# Patient Record
Sex: MALE | Race: WHITE
[De-identification: names, ages, dates, MRNs, and addresses within clinical notes are randomized per-mention and may not be internally consistent; named-entity substitution may affect disease eponyms.]

---

## 2017-04-19 ENCOUNTER — HOSPITAL ENCOUNTER (OUTPATIENT)
Dept: HOSPITAL 47 - CPPFTMAIN | Age: 67
Discharge: HOME | End: 2017-04-19
Payer: MEDICARE

## 2017-04-19 DIAGNOSIS — J45.909: Primary | ICD-10-CM

## 2017-04-19 PROCEDURE — 94729 DIFFUSING CAPACITY: CPT

## 2017-04-19 PROCEDURE — 94726 PLETHYSMOGRAPHY LUNG VOLUMES: CPT

## 2017-04-19 PROCEDURE — 94060 EVALUATION OF WHEEZING: CPT

## 2017-06-15 ENCOUNTER — HOSPITAL ENCOUNTER (OUTPATIENT)
Dept: HOSPITAL 47 - ORWHC2ENDO | Age: 67
Discharge: HOME | End: 2017-06-15
Payer: MEDICARE

## 2017-06-15 VITALS — TEMPERATURE: 97.3 F

## 2017-06-15 VITALS — DIASTOLIC BLOOD PRESSURE: 80 MMHG | SYSTOLIC BLOOD PRESSURE: 121 MMHG | HEART RATE: 84 BPM

## 2017-06-15 VITALS — BODY MASS INDEX: 27.2 KG/M2

## 2017-06-15 VITALS — RESPIRATION RATE: 18 BRPM

## 2017-06-15 DIAGNOSIS — Z12.11: Primary | ICD-10-CM

## 2017-06-15 DIAGNOSIS — Z79.899: ICD-10-CM

## 2017-06-15 DIAGNOSIS — Z91.09: ICD-10-CM

## 2017-06-15 DIAGNOSIS — Z91.018: ICD-10-CM

## 2017-06-15 DIAGNOSIS — J44.9: ICD-10-CM

## 2017-06-15 DIAGNOSIS — K57.30: ICD-10-CM

## 2017-06-15 DIAGNOSIS — Z86.010: ICD-10-CM

## 2017-06-15 DIAGNOSIS — E78.5: ICD-10-CM

## 2017-06-15 NOTE — P.PCN
Date of Procedure: 06/15/17


Preoperative Diagnosis: 





Postoperative Diagnosis: 





Procedure(s) Performed: 


Procedure: Total colonoscopy.





Preoperative diagnosis: Screening for neoplasia, patient has history of polyps.





Postoperative diagnosis: Sigmoid diverticulosis with no evidence of acute 

diverticulitis, strictures, polyps or cancer.





Preparation: HalfLytely prep.





Sedation: Was provided by anesthesia.





Brief clinical history: The patient is a 66-year-old male who is scheduled for 

this evaluation for screening for neoplasia because of history of polyps.  He 

has no abdominal complaints, bleeding or anemia.  His last colonoscopy was in 

January 2012.  





Procedure: With the patient on his left lateral decubitus position and after 

informed consent and adequate sedation, the perianal area was inspected and it 

did not show any fissures or fistulas.  There were no masses felt on digital 

rectal examination.  The Olympus CFQ 160L was then inserted in the rectum in 

the usual fashion and advanced to the cecum.  The preparation was less than 

than ideal on the right side.  The mucosa appeared healthy.  No polyps or 

tumors were seen.  Several diverticular orifices were seen scattered in the 

sigmoid with no evidence of acute diverticulitis or strictures. I retroflexed 

the endoscope in the rectum before the endoscope was withdrawn.





The patient tolerated the procedure well.








Plan: The patient was reassured.  Discussed dietary measures.  With his less 

than ideal preparation today, I am recommending repeat exam in 3 years.  He 

will follow up with you as planned.


Implants: 





Indications for Procedure: 





Operative Findings: 





Description of Procedure:

## 2018-01-30 ENCOUNTER — HOSPITAL ENCOUNTER (OUTPATIENT)
Dept: HOSPITAL 47 - RADMRIMAIN | Age: 68
Discharge: HOME | End: 2018-01-30
Payer: MEDICARE

## 2018-01-30 DIAGNOSIS — M75.101: Primary | ICD-10-CM

## 2018-01-30 DIAGNOSIS — M25.711: ICD-10-CM

## 2018-01-30 NOTE — MR
EXAMINATION TYPE: MR shoulder RT wo con

 

DATE OF EXAM: 1/30/2018

 

COMPARISON: Plain film 1/12/2018

 

HISTORY:

 

TECHNIQUE: Multiplanar, multisequence imaging of the right shoulder is performed without contrast.

 

FINDINGS:

 

Rotator Cuff: There is torn supraspinatus tendon with retraction to the level of the acromioclavicula
r joint. Increased signal present within the infraspinatus tendon compatible with tendinosis.

 

Acromioclavicular Joint: Hypertrophic change present, acromioclavicular joint arthropathy noted with 
mass effect in the region of musculotendinous junction of supraspinatus, there is a distal acromial s
pur.

 

Glenohumeral Joint: Intact there is some hypertrophic change in the humeral head compatible with oste
oarthritic change

 

Labrum: Increased signal present in the superior labrum compatible with possible SLAP lesion

 

Biceps Tendon: The long head of biceps is in normal location within bicipital groove. Fluid signal pr
esent along the long head of biceps tendon.

 

Bone marrow signal: No focal abnormal marrow signal is appreciated.

 

Other: There is a joint effusion present. Fluid signal present in the subacromial subdeltoid bursa. F
luid signal extends along the region of the subscapularis tendon.

 

IMPRESSION: 

Rotator cuff tear. Distal acromial spur. Joint effusion. Possible SLAP lesion.

## 2018-03-18 NOTE — HP
HISTORY AND PHYSICAL



CHIEF COMPLAINT:

Right shoulder pain.



HISTORY OF PRESENT ILLNESS:

The patient is a 67-year-old, right-hand dominant, retired male who presents with

progressive right shoulder pain for the past several months.  He is having pain with

overhead use and at night despite conservative measures with 2 courses of therapy in

addition to medications.



PAST MEDICAL HISTORY:

Significant for asthma.



PAST SURGICAL HISTORY:

Negative.



CURRENT MEDICATIONS:

Advair, atorvastatin and niacin.



ALLERGIES:

Denies drug allergies.



FAMILY HISTORY:

Significant for heart disease.



SOCIAL HISTORY:

Negative for current tobacco or alcohol use.



REVIEW OF SYSTEMS:

Sixteen point review of systems otherwise reviewed and is noncontributory.



PHYSICAL EXAMINATION:

On examination, the patient is approximately 5 foot 10, 190 pounds, of mesomorphic

habitus.  HEENT exam is nonfocal.  Neck is supple.  On examination of his right

shoulder, he is tender about the anterior subacromial space.  He has moderate

subacromial crepitus.  Active range of motion forward elevation 150 degrees, external

rotation of the arm side 60 degrees, internal rotation to L3.  Motor strength is 5-/5

for abduction and external rotation.  Impingement test, Neer, and Speed tests are

positive.  His distal neurovascular exam otherwise appears to be intact in the right

upper extremity.



MRI report from 01/30/2018 of the right shoulder shows a supraspinatus tear with some

retraction along with a superior labral tear.



IMPRESSION:

1. Right shoulder symptomatic rotator cuff tear.

2. Right shoulder symptomatic SLAP lesion.



RECOMMENDATIONS:

I talked to the patient at length regarding his options.  At this point he is having

significant symptoms despite conservative measures.  After thorough discussion, he opts

to proceed with surgery.  We will plan to proceed with arthroscopic evaluation with

probable subacromial decompression, probable rotator cuff repair and possible biceps

tendon release.  Risks and benefits were discussed at length in layman's terms.  We

will likely perform that as an outpatient procedure.





MMODL / IJN: [id number] / Job#: [id number]

## 2018-03-27 ENCOUNTER — HOSPITAL ENCOUNTER (OUTPATIENT)
Dept: HOSPITAL 47 - OR | Age: 68
Discharge: HOME | End: 2018-03-27
Payer: MEDICARE

## 2018-03-27 VITALS — RESPIRATION RATE: 18 BRPM

## 2018-03-27 VITALS — TEMPERATURE: 97.4 F

## 2018-03-27 VITALS — HEART RATE: 104 BPM | DIASTOLIC BLOOD PRESSURE: 82 MMHG | SYSTOLIC BLOOD PRESSURE: 163 MMHG

## 2018-03-27 VITALS — BODY MASS INDEX: 27.8 KG/M2

## 2018-03-27 DIAGNOSIS — S43.431A: ICD-10-CM

## 2018-03-27 DIAGNOSIS — M75.101: Primary | ICD-10-CM

## 2018-03-27 DIAGNOSIS — J45.909: ICD-10-CM

## 2018-03-27 DIAGNOSIS — K21.9: ICD-10-CM
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
## 2018-03-27 DIAGNOSIS — Z79.899: ICD-10-CM

## 2018-03-27 PROCEDURE — 29827 SHO ARTHRS SRG RT8TR CUF RPR: CPT

## 2018-03-27 PROCEDURE — 29826 SHO ARTHRS SRG DECOMPRESSION: CPT

## 2018-03-27 RX ADMIN — MORPHINE SULFATE PRN MG: 2 INJECTION, SOLUTION INTRAMUSCULAR; INTRAVENOUS at 10:59

## 2018-03-27 RX ADMIN — MORPHINE SULFATE PRN MG: 2 INJECTION, SOLUTION INTRAMUSCULAR; INTRAVENOUS at 11:06

## 2018-03-27 RX ADMIN — MORPHINE SULFATE PRN MG: 2 INJECTION, SOLUTION INTRAMUSCULAR; INTRAVENOUS at 11:14

## 2018-03-27 RX ADMIN — MORPHINE SULFATE PRN MG: 2 INJECTION, SOLUTION INTRAMUSCULAR; INTRAVENOUS at 10:53

## 2018-03-27 RX ADMIN — MORPHINE SULFATE PRN MG: 2 INJECTION, SOLUTION INTRAMUSCULAR; INTRAVENOUS at 11:23

## 2018-03-27 NOTE — P.OP
Date of Procedure: 03/27/18


Preoperative Diagnosis: 


Symptomatic right rotator cuff tear


Postoperative Diagnosis: 


Same


Procedure(s) Performed: 


Right shoulder arthroscopic subacromial decompression/rotator cuff repair/

biceps tenotomy/superior labral debridement


Implants: 


Arthrex 4.75 mm swivel lock anchor 3, 5.5 mm swivel lock anchor 1


Anesthesia: COLIN


Surgeon: Gerard Mckeon


Assistant #1: Fuad Zuleta


Estimated Blood Loss (ml): 10


Pathology: none sent


Condition: stable


Disposition: PACU


Indications for Procedure: 


The patient's a 67-year-old male presents with progressive right shoulder pain 

despite conservative measures.  Clinically and by MRI he was noted of evidence 

of asymptomatic rotator cuff tear.  A discussion of the risks and benefits of 

continued conservative measures versus operative intervention was made with the 

patient.  He opted to proceed with surgery.  Specific risks of surgery to 

include infection, neurovascular injury, development of blood clots, possible 

tendon rerupture, possible postoperative stiffness and need for subsequent 

procedures was discussed.  Informed consent was obtained.


Operative Findings: 


As below


Description of Procedure: 


The patient was brought to the operating room, and after induction of general 

anesthesia was placed into a beachchair position.  The bony prominences were 

appropriately padded.  I examined the right shoulder.  There was no gross block 

to passive motion.  There was no gross glenohumeral instability.  The right 

upper extremity was prepped and draped in normal fashion.  The bony outlines 

the acromion, distal clavicle, and coracoid process were outlined with a skin 

marker.  The glenohumeral joint was inflated with 50 mL of saline utilizing a 

spinal needle from a posterior approach.  A posterior portal was made through a 

5 mm skin incision 1 cm medial and inferior to the posterior lateral borders 

acromion.  A blunt trocar was used to easily into the joint. Diagnostic 

arthroscopy was performed.  An anterior portals made just lateral to the 

coracoid process entering the joint above the subscapularis tendon.  The 

subscapularis appeared to be intact.  The anterior labrum was intact.  On 

inspection of the superior labrum, a type II SLAP lesion was noted.  The labrum 

was debrided back to stable base with a motorized shaver.  The biceps was 

released from the superior labrum with electrocautery and lateral retracted 

bicipital groove.  On inspection the rotator cuff, a tear involving the 

supraspinatus and a portion in for space was noted with mild retraction.  The 

posterior portion of the cuff appeared to be intact.  The arthroscope was then 

placed into the subacromial space.  A lateral portal was made through a 5 mm 

skin incision 2 cm lateral to the anterior lateral border of the acromion.  The 

soft tissue on the undersurface the acromion was removed with electrocautery 

and a shaver clearly defining the anterior medial and lateral borders as well 

as the distal clavicle.  The coracoacromial ligament was detached from the 

anterior acromion with electrocautery.  An anterior inferior acromioplasty is 

performed starting with a motorized yvette anterolateral, then extending this 

posteriorly, then extends medially.  Is able to convert to a flat acromion.  

This is verified from the posterior lateral viewing portals.  The greater 

tuberosity was lightly decorticated utilizing a motorized yvette down to a 

bleeding bony surface.  An accessory superior lateral portal was made just off 

the lateral edge acromion through a 4 mm skin incision for placement of the 

anchors.  2 anchors were then placed just off the articular surface with the 

appropriate starting awl.  Good purchase was obtained.  The fiber tape was then 

passed through the rotator cuff with a scorpion suture passer.  A lateral row 

was created crisscrossing the tapes.  A 5.5 mm and 4.75 mm swivel lock anchor 

was placed laterally.  The sutures were appropriately tensioned.  Final 

arthroscopic view showed adequate compression of the rotator cuff over the 

native footprint.  The arthroscope was then removed.  The portals were closed 

with simple 3-0 nylon suture.  A sterile dressing was applied in addition to an 

abductor brace.  The patient was awoken from general anesthesia and transferred 

to recovery room in good condition.  Blood loss was estimated at 10 mL.  No 

complications were incurred.  Sponge and needle counts were correct at the end 

of the case.

## 2018-04-30 ENCOUNTER — HOSPITAL ENCOUNTER (EMERGENCY)
Dept: HOSPITAL 47 - EC | Age: 68
Discharge: HOME | End: 2018-04-30
Payer: MEDICARE

## 2018-04-30 VITALS
SYSTOLIC BLOOD PRESSURE: 172 MMHG | HEART RATE: 103 BPM | DIASTOLIC BLOOD PRESSURE: 90 MMHG | TEMPERATURE: 97.5 F | RESPIRATION RATE: 18 BRPM

## 2018-04-30 DIAGNOSIS — E78.5: ICD-10-CM

## 2018-04-30 DIAGNOSIS — Z79.51: ICD-10-CM

## 2018-04-30 DIAGNOSIS — Z91.018: ICD-10-CM

## 2018-04-30 DIAGNOSIS — J45.909: Primary | ICD-10-CM

## 2018-04-30 DIAGNOSIS — Z91.09: ICD-10-CM

## 2018-04-30 DIAGNOSIS — Z91.011: ICD-10-CM

## 2018-04-30 DIAGNOSIS — Z79.899: ICD-10-CM

## 2018-04-30 PROCEDURE — 99283 EMERGENCY DEPT VISIT LOW MDM: CPT

## 2018-04-30 PROCEDURE — 71046 X-RAY EXAM CHEST 2 VIEWS: CPT

## 2018-04-30 NOTE — XR
EXAMINATION TYPE: XR chest 2V

 

DATE OF EXAM: 4/30/2018

 

COMPARISON: NONE

 

HISTORY: Cough

 

TECHNIQUE:  Frontal and lateral views of the chest are obtained.

 

FINDINGS:  Heart and mediastinum are normal. Lungs are clear. Diaphragm is normal. Bony thorax appear
s normal.

 

IMPRESSION:  Normal chest

## 2018-04-30 NOTE — ED
URI HPI





- General


Chief Complaint: Upper Respiratory Infection


Stated Complaint: cough


Time Seen by Provider: 04/30/18 02:41


Source: patient, RN notes reviewed


Mode of arrival: ambulatory


Limitations: no limitations





- History of Present Illness


Initial Comments: 


This is a 67-year-old male who presents to the emergency department with chief 

complaint of cough.  Patient states that on Friday he was diagnosed with 

bronchitis.  He states a chest x-ray was not obtained at that time.  He states 

he was started on Augmentin.  On Sunday he started taking steroids that were 

prescribed.  Patient states that he is having difficulty sleeping because he 

continues to have a persistent cough.  He states that he has been coughing so 

much that he has developed a headache and has come close to vomiting a couple 

of times after coughing.  He states that he used an at-home nebulizer treatment 

this evening at 8 PM and then tried to do another treatment 6 hours later but 

developed a fit of coughing.  He denies any fevers or chills, abdominal pain, 

chest pain, nausea or vomiting, diarrhea or constipation, dysuria or hematuria, 

dizziness or vision changes.  Patient states that he has had bronchitis and/or 

pneumonia 4-5 times in the last couple of years.








- Related Data


 Home Medications











 Medication  Instructions  Recorded  Confirmed


 


Atorvastatin [Lipitor] 40 mg PO HS 06/13/17 03/27/18


 


Finasteride [Proscar] 5 mg PO HS 06/13/17 03/27/18


 


Fluticasone/Salmeterol [Advair Hfa 2 puff INHALATION BID 06/13/17 03/27/18





230-21 Mcg Inhaler]   


 


Niacin 500 mg PO HS 06/13/17 03/27/18


 


Omeprazole 20 mg PO DAILY PRN 03/23/18 03/27/18








 Previous Rx's











 Medication  Instructions  Recorded


 


HYDROcodone/APAP 7.5-325MG [Norco 1 each PO Q6HR PRN #30 tab 03/27/18





7.5]  


 


Azithromycin 250 mg PO DAILY #4 tab 04/30/18











 Allergies











Allergy/AdvReac Type Severity Reaction Status Date / Time


 


Milk Containing Products Allergy  Cough Verified 04/30/18 02:40





[Dairy]     


 


pollen extracts Allergy  Cough Verified 04/30/18 02:40


 


wheat Allergy  Cough Verified 04/30/18 02:40














Review of Systems


ROS Statement: 


Those systems with pertinent positive or pertinent negative responses have been 

documented in the HPI.





ROS Other: All systems not noted in ROS Statement are negative.





Past Medical History


Past Medical History: Asthma, GERD/Reflux, Hyperlipidemia, Sleep Apnea/CPAP/

BIPAP


Additional Past Medical History / Comment(s): doesn't use CPAP anymore since 

surgery


History of Any Multi-Drug Resistant Organisms: None Reported


Past Surgical History: Orthopedic Surgery, Tonsillectomy


Additional Past Surgical History / Comment(s): uvulopalatopharyngoplasty, toe 

surg., nasal surg.


Past Anesthesia/Blood Transfusion Reactions: No Reported Reaction


Past Psychological History: No Psychological Hx Reported


Smoking Status: Never smoker


Past Alcohol Use History: Occasional


Past Drug Use History: None Reported





- Past Family History


  ** Brother(s)


Family Medical History: Cancer





General Exam





- General Exam Comments


Initial Comments: 





General: Awake and alert, well-developed; in no apparent distress.  Wife is at 

bedside.


HEENT: Head atraumatic, normocephalic. Pupils are equal, round and reactive to 

light. Extraocular movements intact. Oropharynx moist without erythema or 

exudate. 


Neck: Supple. Normal ROM. 


Cardiovascular: Regular rate and rhythm. No murmurs, rubs or gallops. Chest 

symmetrical.  


Respiratory: Rhonchi noted bilateral lung bases.  No wheezes or rales.  Normal 

respiratory effort with no use of accessory muscles. 


Abdomen: Soft, non-tender, non-distended. No rigidity, rebound or guarding. 

Normal bowel sounds in all 4 quadrants. 


Musculoskeletal: Right arm is in a supportive sling.  Normal range of motion 

and no tenderness bilateral lower extremities.  Ambulating normally. 


Skin: Pink, warm and dry without rashes or lesions. 


Neurological: Alert and oriented x3. CN II-XII grossly intact. Speech is fluent 

and answers are appropriate. No focal neuro deficits. 


Psychiatric: Normal mood and affect. No overt signs of depression or anxiety 

noted. 











Limitations: no limitations





Course


 Vital Signs











  04/30/18





  02:35


 


Temperature 97.5 F L


 


Pulse Rate 103 H


 


Respiratory 18





Rate 


 


Blood Pressure 172/90


 


O2 Sat by Pulse 97





Oximetry 














Medical Decision Making





- Medical Decision Making


This is a 67-year-old male who presented to the emergency department with chief 

complaint of cough.  Patient states he was diagnosed with bronchitis on Friday 

and has been taking Augmentin since then.  He states he has taken one dose of 

prednisone.  He states that he is having difficulty sleeping because the cough 

has been so persistent.  Denies any fevers or chills, shortness of breath or 

chest pain.  An chest x-ray was obtained and revealed no acute abnormalities.  

Case was discussed with attending physician, Dr. Matthews.  Recommended adding 

azithromycin or discontinuing Augmentin and starting patient on Levaquin.  

Options and findings were discussed with patient who wishes to add on 

azithromycin.  First dose was given in the emergency department.  Recommended 

continuing Augmentin and finishing course of antibiotics.  He is to also 

continue taking prednisone as prescribed.  Patient's vital signs are stable and 

he is in no acute distress.  He will be discharged home.  All questions were 

answered.








Disposition


Clinical Impression: 


 Bronchitis





Disposition: HOME SELF-CARE


Condition: Good


Instructions:  Acute Bronchitis (ED)


Additional Instructions: 


Please take medications as prescribed. Please follow up with primary care 

provider within 1-2 days. Return to emergency department if symptoms should 

worsen or any concerns arise. 


Prescriptions: 


Azithromycin 250 mg PO DAILY #4 tab


Is patient prescribed a controlled substance at d/c from ED?: No


Referrals: 


Jose Alfredo Hope MD [Primary Care Provider] - 1-2 days


Time of Disposition: 03:40

## 2018-05-16 ENCOUNTER — HOSPITAL ENCOUNTER (OUTPATIENT)
Dept: HOSPITAL 47 - LABWHC1 | Age: 68
Discharge: HOME | End: 2018-05-16
Attending: ALLERGY & IMMUNOLOGY
Payer: MEDICARE

## 2018-05-16 DIAGNOSIS — J45.41: Primary | ICD-10-CM

## 2018-05-16 LAB
BASOPHILS # BLD AUTO: 0 K/UL (ref 0–0.2)
BASOPHILS NFR BLD AUTO: 0 %
EOSINOPHIL # BLD AUTO: 0.1 K/UL (ref 0–0.7)
EOSINOPHIL NFR BLD AUTO: 1 %
ERYTHROCYTE [DISTWIDTH] IN BLOOD BY AUTOMATED COUNT: 4.35 M/UL (ref 4.3–5.9)
ERYTHROCYTE [DISTWIDTH] IN BLOOD: 13.2 % (ref 11.5–15.5)
HCT VFR BLD AUTO: 41.2 % (ref 39–53)
HGB BLD-MCNC: 13.5 GM/DL (ref 13–17.5)
LYMPHOCYTES # SPEC AUTO: 0.8 K/UL (ref 1–4.8)
LYMPHOCYTES NFR SPEC AUTO: 11 %
MCH RBC QN AUTO: 31 PG (ref 25–35)
MCHC RBC AUTO-ENTMCNC: 32.7 G/DL (ref 31–37)
MCV RBC AUTO: 94.9 FL (ref 80–100)
MONOCYTES # BLD AUTO: 0.3 K/UL (ref 0–1)
MONOCYTES NFR BLD AUTO: 4 %
NEUTROPHILS # BLD AUTO: 5.8 K/UL (ref 1.3–7.7)
NEUTROPHILS NFR BLD AUTO: 83 %
PLATELET # BLD AUTO: 295 K/UL (ref 150–450)
WBC # BLD AUTO: 7 K/UL (ref 3.8–10.6)

## 2018-05-16 PROCEDURE — 86003 ALLG SPEC IGE CRUDE XTRC EA: CPT

## 2018-05-16 PROCEDURE — 85652 RBC SED RATE AUTOMATED: CPT

## 2018-05-16 PROCEDURE — 86140 C-REACTIVE PROTEIN: CPT

## 2018-05-16 PROCEDURE — 85025 COMPLETE CBC W/AUTO DIFF WBC: CPT

## 2018-05-16 PROCEDURE — 36415 COLL VENOUS BLD VENIPUNCTURE: CPT

## 2018-05-16 PROCEDURE — 82785 ASSAY OF IGE: CPT

## 2018-05-23 ENCOUNTER — HOSPITAL ENCOUNTER (OUTPATIENT)
Dept: HOSPITAL 47 - LABWHC1 | Age: 68
Discharge: HOME | End: 2018-05-23
Attending: ALLERGY & IMMUNOLOGY
Payer: MEDICARE

## 2018-05-23 DIAGNOSIS — J45.41: Primary | ICD-10-CM

## 2018-05-23 PROCEDURE — 80198 ASSAY OF THEOPHYLLINE: CPT

## 2018-05-23 PROCEDURE — 36415 COLL VENOUS BLD VENIPUNCTURE: CPT

## 2018-06-06 ENCOUNTER — HOSPITAL ENCOUNTER (OUTPATIENT)
Dept: HOSPITAL 47 - RADCTMAIN | Age: 68
Discharge: HOME | End: 2018-06-06
Attending: ALLERGY & IMMUNOLOGY
Payer: MEDICARE

## 2018-06-06 DIAGNOSIS — I25.10: ICD-10-CM

## 2018-06-06 DIAGNOSIS — J98.4: Primary | ICD-10-CM

## 2018-06-06 PROCEDURE — 71250 CT THORAX DX C-: CPT

## 2018-06-06 NOTE — CT
EXAMINATION TYPE: CT chest wo con

 

DATE OF EXAM: 6/6/2018

 

COMPARISON: 3/30/2016

 

HISTORY: Bronchiectasis

 

CT DLP: 700.7 mGycm.  Automated Exposure Control for Dose Reduction was Utilized.

 

 

TECHNIQUE:  CT scan of the thorax is performed without IV contrast.

 

FINDINGS:

 

LUNGS: Although the lower lobe bronchi appear prominent visually these bronchi do not measure larger 
than the adjacent pulmonary arteries on both supine and prone imaging and do not meet criteria for br
onchiectasis. No peribronchial cuffing is appreciated either. No subpleural reticulation or honeycomb
ing is seen. No interlobular septal thickening. There is redemonstration of a benign left basilar gra
nuloma. No pulmonary mass. No focal pleural thickening. No pulmonary blebs or bulla. No significant e
mphysematous change. Right middle lobe pleural parenchymal scarring and lingular pleural parenchymal 
scarring as well as linear scarring along the interlobar fissure on the left are incidentally noted. 
The tracheobronchial tree is patent.

 

MEDIASTINUM: Lack of IV contrast is noted to limit evaluation for mediastinal and especially hilar ad
enopathy. There are no definitive greater than 1 cm hilar or mediastinal lymph nodes.   Moderate thre
e-vessel coronary artery calcifications are present. No cardiomegaly or pericardial effusion is seen.
 

 

OTHER: Incidental note is made of a nonobstructing left upper poler 3 mm calculus within the kidney.

 

IMPRESSION: 

1. No CT evidence of bronchiectasis.

2. Benign parenchymal granulomatous changes.

3. No evidence of pulmonary fibrosis or interstitial lung disease. No focal consolidation, pleural ef
fusion or pulmonary vascular congestion.

4. Incidentally noted nonobstructing left upper pole 3 mm calculus.

5. Moderate three-vessel coronary artery atheromatous changes, marker of coronary artery disease.

## 2018-06-20 ENCOUNTER — HOSPITAL ENCOUNTER (EMERGENCY)
Dept: HOSPITAL 47 - EC | Age: 68
Discharge: HOME | End: 2018-06-20
Payer: MEDICARE

## 2018-06-20 VITALS — TEMPERATURE: 98 F

## 2018-06-20 VITALS — HEART RATE: 101 BPM | RESPIRATION RATE: 18 BRPM | SYSTOLIC BLOOD PRESSURE: 138 MMHG | DIASTOLIC BLOOD PRESSURE: 74 MMHG

## 2018-06-20 DIAGNOSIS — R91.8: ICD-10-CM

## 2018-06-20 DIAGNOSIS — Z91.018: ICD-10-CM

## 2018-06-20 DIAGNOSIS — Z91.011: ICD-10-CM

## 2018-06-20 DIAGNOSIS — J45.909: ICD-10-CM

## 2018-06-20 DIAGNOSIS — Z79.51: ICD-10-CM

## 2018-06-20 DIAGNOSIS — Z79.899: ICD-10-CM

## 2018-06-20 DIAGNOSIS — E78.5: ICD-10-CM

## 2018-06-20 DIAGNOSIS — M51.26: ICD-10-CM

## 2018-06-20 DIAGNOSIS — Z91.048: ICD-10-CM

## 2018-06-20 DIAGNOSIS — N13.2: Primary | ICD-10-CM

## 2018-06-20 DIAGNOSIS — Z98.890: ICD-10-CM

## 2018-06-20 DIAGNOSIS — M48.061: ICD-10-CM

## 2018-06-20 LAB
ALBUMIN SERPL-MCNC: 4.1 G/DL (ref 3.5–5)
ALP SERPL-CCNC: 69 U/L (ref 38–126)
ALT SERPL-CCNC: 40 U/L (ref 21–72)
AMYLASE SERPL-CCNC: 56 U/L (ref 30–110)
ANION GAP SERPL CALC-SCNC: 11 MMOL/L
APTT BLD: 21.6 SEC (ref 22–30)
AST SERPL-CCNC: 26 U/L (ref 17–59)
BASOPHILS # BLD AUTO: 0 K/UL (ref 0–0.2)
BASOPHILS NFR BLD AUTO: 0 %
BUN SERPL-SCNC: 17 MG/DL (ref 9–20)
CALCIUM SPEC-MCNC: 9.6 MG/DL (ref 8.4–10.2)
CHLORIDE SERPL-SCNC: 106 MMOL/L (ref 98–107)
CO2 SERPL-SCNC: 24 MMOL/L (ref 22–30)
EOSINOPHIL # BLD AUTO: 0.3 K/UL (ref 0–0.7)
EOSINOPHIL NFR BLD AUTO: 5 %
ERYTHROCYTE [DISTWIDTH] IN BLOOD BY AUTOMATED COUNT: 4.6 M/UL (ref 4.3–5.9)
ERYTHROCYTE [DISTWIDTH] IN BLOOD: 13.2 % (ref 11.5–15.5)
GLUCOSE SERPL-MCNC: 113 MG/DL (ref 74–99)
HCT VFR BLD AUTO: 42.9 % (ref 39–53)
HGB BLD-MCNC: 14.2 GM/DL (ref 13–17.5)
INR PPP: 1 (ref ?–1.2)
LIPASE SERPL-CCNC: 139 U/L (ref 23–300)
LYMPHOCYTES # SPEC AUTO: 1.7 K/UL (ref 1–4.8)
LYMPHOCYTES NFR SPEC AUTO: 26 %
MCH RBC QN AUTO: 30.8 PG (ref 25–35)
MCHC RBC AUTO-ENTMCNC: 33.1 G/DL (ref 31–37)
MCV RBC AUTO: 93.1 FL (ref 80–100)
MONOCYTES # BLD AUTO: 0.4 K/UL (ref 0–1)
MONOCYTES NFR BLD AUTO: 6 %
NEUTROPHILS # BLD AUTO: 3.9 K/UL (ref 1.3–7.7)
NEUTROPHILS NFR BLD AUTO: 60 %
PH UR: 6 [PH] (ref 5–8)
PLATELET # BLD AUTO: 274 K/UL (ref 150–450)
POTASSIUM SERPL-SCNC: 4.5 MMOL/L (ref 3.5–5.1)
PROT SERPL-MCNC: 6.3 G/DL (ref 6.3–8.2)
PT BLD: 9.6 SEC (ref 9–12)
RBC UR QL: >182 /HPF (ref 0–5)
SODIUM SERPL-SCNC: 141 MMOL/L (ref 137–145)
SP GR UR: 1.02 (ref 1–1.03)
UROBILINOGEN UR QL STRIP: <2 MG/DL (ref ?–2)
WBC # BLD AUTO: 6.4 K/UL (ref 3.8–10.6)
WBC #/AREA URNS HPF: 2 /HPF (ref 0–5)

## 2018-06-20 PROCEDURE — 85025 COMPLETE CBC W/AUTO DIFF WBC: CPT

## 2018-06-20 PROCEDURE — 87086 URINE CULTURE/COLONY COUNT: CPT

## 2018-06-20 PROCEDURE — 80053 COMPREHEN METABOLIC PANEL: CPT

## 2018-06-20 PROCEDURE — 83690 ASSAY OF LIPASE: CPT

## 2018-06-20 PROCEDURE — 85730 THROMBOPLASTIN TIME PARTIAL: CPT

## 2018-06-20 PROCEDURE — 74018 RADEX ABDOMEN 1 VIEW: CPT

## 2018-06-20 PROCEDURE — 81001 URINALYSIS AUTO W/SCOPE: CPT

## 2018-06-20 PROCEDURE — 96375 TX/PRO/DX INJ NEW DRUG ADDON: CPT

## 2018-06-20 PROCEDURE — 96361 HYDRATE IV INFUSION ADD-ON: CPT

## 2018-06-20 PROCEDURE — 99285 EMERGENCY DEPT VISIT HI MDM: CPT

## 2018-06-20 PROCEDURE — 82150 ASSAY OF AMYLASE: CPT

## 2018-06-20 PROCEDURE — 74176 CT ABD & PELVIS W/O CONTRAST: CPT

## 2018-06-20 PROCEDURE — 36415 COLL VENOUS BLD VENIPUNCTURE: CPT

## 2018-06-20 PROCEDURE — 85610 PROTHROMBIN TIME: CPT

## 2018-06-20 PROCEDURE — 96376 TX/PRO/DX INJ SAME DRUG ADON: CPT

## 2018-06-20 PROCEDURE — 96374 THER/PROPH/DIAG INJ IV PUSH: CPT

## 2018-06-20 NOTE — XR
EXAMINATION TYPE: XR KUB

 

DATE OF EXAM: 6/20/2018

 

CLINICAL DATA:  67-year-old male with abdominal pain, [facility]

 

COMPARISON:  1/5/2016

 

FINDINGS:

 

Supine imaging limited for assessment of free intraperitoneal air. 

 

Nonobstructive bowel gas pattern. Scattered mild to moderate stool.

 

There is a new 5 mm calcification left paramedian mid abdomen lower and more medial than expected for
 the left kidney. The previous left-sided renal calculi are no longer seen. 5 mm right mid abdominal 
calculus likely within the kidney.

 

 

 

IMPRESSION:

 

1. If there is left-sided renal colic, findings suggest a 5 mm left midureteral calculus.

2. Additional 5 mm right mid abdominal calcification probably located within the kidney.

## 2018-06-20 NOTE — ED
Abdominal Pain HPI





- General


Chief Complaint: Abdominal Pain


Stated Complaint: POSS KIDNEY STONE


Time Seen by Provider: 06/20/18 08:11


Source: patient, RN notes reviewed, old records reviewed


Mode of arrival: ambulatory


Limitations: no limitations





- History of Present Illness


Initial Comments: 





This patient's a 59-year-old male presents to the emergency department stay 

chief complaint of left-sided flank pain sudden onset at 4 AM.  He has had 

history of kidney stones reports that the pain feels similar to past kidney 

stones.  He stated he vomited multiple times today.  Patient reports that he 

has had no recent fever or chills.  Normal bowel habits.  He reports the pain 

will radiate from the left flank into the groin.  He denies any chest pain or 

shortness of breath.  No diarrhea or bloody stools or bloody emesis.  Patient 

ports that he's had have lithotripsy to have stones removed in the past.  He 

did have a computed tomography scan earlier in this month of his chest.  He had 

an incidental finding with 3 mm left kidney stone.





- Related Data


 Home Medications











 Medication  Instructions  Recorded  Confirmed


 


Atorvastatin [Lipitor] 40 mg PO HS 06/13/17 03/27/18


 


Finasteride [Proscar] 5 mg PO HS 06/13/17 03/27/18


 


Fluticasone/Salmeterol [Advair Hfa 2 puff INHALATION BID 06/13/17 03/27/18





230-21 Mcg Inhaler]   


 


Niacin 500 mg PO HS 06/13/17 03/27/18


 


Omeprazole 20 mg PO DAILY PRN 03/23/18 03/27/18








 Previous Rx's











 Medication  Instructions  Recorded


 


HYDROcodone/APAP 7.5-325MG [Norco 1 each PO Q6HR PRN #30 tab 03/27/18





7.5]  


 


Azithromycin 250 mg PO DAILY #4 tab 04/30/18


 


HYDROcodone/APAP 5-325MG [Norco 1 tab PO Q4HR PRN 3 Days #15 tab 06/20/18





5-325]  


 


Ketorolac [Toradol] 10 mg PO Q6HR #15 tab 06/20/18


 


Ondansetron Odt [Zofran Odt] 4 mg PO Q8HR PRN #12 tab 06/20/18


 


Tamsulosin HCl [Flomax] 0.4 mg PO DAILY #10 cap 06/20/18











 Allergies











Allergy/AdvReac Type Severity Reaction Status Date / Time


 


Milk Containing Products Allergy  Cough Verified 06/20/18 08:06





[Dairy]     


 


pollen extracts Allergy  Cough Verified 06/20/18 08:06


 


wheat Allergy  Cough Verified 06/20/18 08:06














Review of Systems


ROS Statement: 


Those systems with pertinent positive or pertinent negative responses have been 

documented in the HPI.





ROS Other: All systems not noted in ROS Statement are negative.





Past Medical History


Past Medical History: Asthma, GERD/Reflux, Hyperlipidemia, Sleep Apnea/CPAP/

BIPAP


Additional Past Medical History / Comment(s): doesn't use CPAP anymore since 

surgery, kidney stone.


History of Any Multi-Drug Resistant Organisms: None Reported


Past Surgical History: Orthopedic Surgery, Tonsillectomy


Additional Past Surgical History / Comment(s): uvulopalatopharyngoplasty, toe 

surg., nasal surg.


Past Anesthesia/Blood Transfusion Reactions: No Reported Reaction


Past Psychological History: No Psychological Hx Reported


Smoking Status: Never smoker


Past Alcohol Use History: Occasional


Past Drug Use History: None Reported





- Past Family History


  ** Brother(s)


Family Medical History: Cancer





General Exam





- General Exam Comments


Initial Comments: 





67-year-old male.  Alert and oriented.  No significant distress.


Limitations: no limitations


General appearance: alert, in no apparent distress


Head exam: Present: atraumatic, normocephalic, normal inspection


Eye exam: Present: normal appearance, PERRL, EOMI.  Absent: scleral icterus, 

conjunctival injection, periorbital swelling


ENT exam: Present: normal exam


Neck exam: Present: normal inspection.  Absent: tenderness, meningismus, 

lymphadenopathy


Respiratory exam: Present: normal lung sounds bilaterally.  Absent: respiratory 

distress, wheezes, rales, rhonchi, stridor


Cardiovascular Exam: Present: regular rate, normal rhythm, normal heart sounds.

  Absent: systolic murmur, diastolic murmur, rubs, gallop, clicks


GI/Abdominal exam: Present: soft, tenderness (Left flank, CVA tenderness.), 

normal bowel sounds.  Absent: distended, guarding, rebound, rigid


Extremities exam: Present: normal inspection, full ROM, normal capillary 

refill.  Absent: tenderness, pedal edema, joint swelling, calf tenderness


Back exam: Present: normal inspection


Neurological exam: Present: alert, oriented X3, CN II-XII intact


Psychiatric exam: Present: normal affect, normal mood


Skin exam: Present: warm, dry, intact, normal color.  Absent: rash





Course


 Vital Signs











  06/20/18 06/20/18





  08:04 12:15


 


Temperature 98 F 


 


Pulse Rate 97 101 H


 


Respiratory 20 18





Rate  


 


Blood Pressure 153/80 138/74


 


O2 Sat by Pulse 100 97





Oximetry  














Medical Decision Making





- Medical Decision Making





This is a 67 year old male with CC of sudden left flank pain this morning, 

concerned for ureter stone.vPatient UA is positive for hematuria. Kidney 

function and labs are normal. Patient given IV toradol, morphine, and fluids, 

and flomax. KUB shows 5mm left mid ureteral stone. Discussed with Dr. Plata, 

recommended CT scan. CT shows 4.9 mm left ureter stone with mild 

hydronephrosis. Discussed this should pass on its own. Urine culture obtained. 

Given referral for urology. Discussed DC with pain medication, flomax, toradol, 

and zofran. Return parameters discussed. 





- Lab Data


Result diagrams: 


 06/20/18 08:30





 06/20/18 08:30


 Lab Results











  06/20/18 06/20/18 06/20/18 Range/Units





  08:30 08:30 08:30 


 


WBC   6.4   (3.8-10.6)  k/uL


 


RBC   4.60   (4.30-5.90)  m/uL


 


Hgb   14.2   (13.0-17.5)  gm/dL


 


Hct   42.9   (39.0-53.0)  %


 


MCV   93.1   (80.0-100.0)  fL


 


MCH   30.8   (25.0-35.0)  pg


 


MCHC   33.1   (31.0-37.0)  g/dL


 


RDW   13.2   (11.5-15.5)  %


 


Plt Count   274   (150-450)  k/uL


 


Neutrophils %   60   %


 


Lymphocytes %   26   %


 


Monocytes %   6   %


 


Eosinophils %   5   %


 


Basophils %   0   %


 


Neutrophils #   3.9   (1.3-7.7)  k/uL


 


Lymphocytes #   1.7   (1.0-4.8)  k/uL


 


Monocytes #   0.4   (0-1.0)  k/uL


 


Eosinophils #   0.3   (0-0.7)  k/uL


 


Basophils #   0.0   (0-0.2)  k/uL


 


PT    9.6  (9.0-12.0)  sec


 


INR    1.0  (<1.2)  


 


APTT    21.6 L  (22.0-30.0)  sec


 


Sodium  141    (137-145)  mmol/L


 


Potassium  4.5    (3.5-5.1)  mmol/L


 


Chloride  106    ()  mmol/L


 


Carbon Dioxide  24    (22-30)  mmol/L


 


Anion Gap  11    mmol/L


 


BUN  17    (9-20)  mg/dL


 


Creatinine  1.10    (0.66-1.25)  mg/dL


 


Est GFR (CKD-EPI)AfAm  79    (>60 ml/min/1.73 sqM)  


 


Est GFR (CKD-EPI)NonAf  69    (>60 ml/min/1.73 sqM)  


 


Glucose  113 H    (74-99)  mg/dL


 


Calcium  9.6    (8.4-10.2)  mg/dL


 


Total Bilirubin  0.5    (0.2-1.3)  mg/dL


 


AST  26    (17-59)  U/L


 


ALT  40    (21-72)  U/L


 


Alkaline Phosphatase  69    ()  U/L


 


Total Protein  6.3    (6.3-8.2)  g/dL


 


Albumin  4.1    (3.5-5.0)  g/dL


 


Amylase  56    ()  U/L


 


Lipase  139    ()  U/L


 


Urine Color     


 


Urine Appearance     (Clear)  


 


Urine pH     (5.0-8.0)  


 


Ur Specific Gravity     (1.001-1.035)  


 


Urine Protein     (Negative)  


 


Urine Glucose (UA)     (Negative)  


 


Urine Ketones     (Negative)  


 


Urine Blood     (Negative)  


 


Urine Nitrite     (Negative)  


 


Urine Bilirubin     (Negative)  


 


Urine Urobilinogen     (<2.0)  mg/dL


 


Ur Leukocyte Esterase     (Negative)  


 


Urine RBC     (0-5)  /hpf


 


Urine WBC     (0-5)  /hpf


 


Urine Bacteria     (None)  /hpf


 


Urine Mucus     (None)  /hpf














  06/20/18 Range/Units





  10:15 


 


WBC   (3.8-10.6)  k/uL


 


RBC   (4.30-5.90)  m/uL


 


Hgb   (13.0-17.5)  gm/dL


 


Hct   (39.0-53.0)  %


 


MCV   (80.0-100.0)  fL


 


MCH   (25.0-35.0)  pg


 


MCHC   (31.0-37.0)  g/dL


 


RDW   (11.5-15.5)  %


 


Plt Count   (150-450)  k/uL


 


Neutrophils %   %


 


Lymphocytes %   %


 


Monocytes %   %


 


Eosinophils %   %


 


Basophils %   %


 


Neutrophils #   (1.3-7.7)  k/uL


 


Lymphocytes #   (1.0-4.8)  k/uL


 


Monocytes #   (0-1.0)  k/uL


 


Eosinophils #   (0-0.7)  k/uL


 


Basophils #   (0-0.2)  k/uL


 


PT   (9.0-12.0)  sec


 


INR   (<1.2)  


 


APTT   (22.0-30.0)  sec


 


Sodium   (137-145)  mmol/L


 


Potassium   (3.5-5.1)  mmol/L


 


Chloride   ()  mmol/L


 


Carbon Dioxide   (22-30)  mmol/L


 


Anion Gap   mmol/L


 


BUN   (9-20)  mg/dL


 


Creatinine   (0.66-1.25)  mg/dL


 


Est GFR (CKD-EPI)AfAm   (>60 ml/min/1.73 sqM)  


 


Est GFR (CKD-EPI)NonAf   (>60 ml/min/1.73 sqM)  


 


Glucose   (74-99)  mg/dL


 


Calcium   (8.4-10.2)  mg/dL


 


Total Bilirubin   (0.2-1.3)  mg/dL


 


AST   (17-59)  U/L


 


ALT   (21-72)  U/L


 


Alkaline Phosphatase   ()  U/L


 


Total Protein   (6.3-8.2)  g/dL


 


Albumin   (3.5-5.0)  g/dL


 


Amylase   ()  U/L


 


Lipase   ()  U/L


 


Urine Color  Yellow  


 


Urine Appearance  Cloudy  (Clear)  


 


Urine pH  6.0  (5.0-8.0)  


 


Ur Specific Gravity  1.018  (1.001-1.035)  


 


Urine Protein  Trace H  (Negative)  


 


Urine Glucose (UA)  Negative  (Negative)  


 


Urine Ketones  Negative  (Negative)  


 


Urine Blood  Large H  (Negative)  


 


Urine Nitrite  Negative  (Negative)  


 


Urine Bilirubin  Negative  (Negative)  


 


Urine Urobilinogen  <2.0  (<2.0)  mg/dL


 


Ur Leukocyte Esterase  Negative  (Negative)  


 


Urine RBC  >182 H  (0-5)  /hpf


 


Urine WBC  2  (0-5)  /hpf


 


Urine Bacteria  Rare H  (None)  /hpf


 


Urine Mucus  Rare H  (None)  /hpf














- Radiology Data


Radiology results: report reviewed


Bilateral nephrolithiasis with moderate left hydronephrosis secondary to 4.9mm 

proximal left ureteral calculus. Disc protrusion L4-L5 with canal stenosis. 

Subsegmental changes at right lung base more typical of atelectasis than 

infiltrate, Chronic underlining interstitial lung disease and bronchiectasis 

suggested. 





KUB shows left mid ureteral stone, measuring 5mm. 





Disposition


Clinical Impression: 


 Left ureteral stone





Disposition: HOME SELF-CARE


Condition: Good


Instructions:  Ureteral Stones (ED)


Additional Instructions: 


Patient is advised to take medication as prescribed.  Follow-up with urology.  

Given the 4.9 mm stone.  Should pass on its own without medication and 

hydration.  Return to the emergency department if any alarming signs or 

symptoms occur.


Prescriptions: 


HYDROcodone/APAP 5-325MG [Norco 5-325] 1 tab PO Q4HR PRN 3 Days #15 tab


 PRN Reason: Pain


Ketorolac [Toradol] 10 mg PO Q6HR #15 tab


Ondansetron Odt [Zofran Odt] 4 mg PO Q8HR PRN #12 tab


 PRN Reason: Nausea


Tamsulosin HCl [Flomax] 0.4 mg PO DAILY #10 cap


Is patient prescribed a controlled substance at d/c from ED?: Yes


When asked, does pt state using other controlled substances?: No


If prescribed controlled substance>3 days was MAPS reviewed?: Yes


If opioid is for acute pain is fill amount 7 days or less?: Yes


If Rx opioid, was Start Talking consent form obtained?: No


Referrals: 


Jose Alfredo Hope MD [Primary Care Provider] - 1-2 days


Time of Disposition: 11:40

## 2018-06-20 NOTE — CT
EXAMINATION TYPE: CT abdomen pelvis wo con

 

DATE OF EXAM: 6/20/2018

 

COMPARISON: NONE

 

HISTORY: Lt flank pain

 

CT DLP: 1029 mGycm

Automated exposure control for dose reduction was used.

 

TECHNIQUE:  Helical acquisition of images was performed from the lung bases through the pelvis.

 

FINDINGS: 

 

LUNG BASES: Subsegmental changes at the lung bases are most typical atelectasis or scar. Basilar bron
chiectasis suggested. The heart is enlarged..

 

LIVER/GB: No significant abnormality is appreciated.

 

PANCREAS: No significant abnormality is seen.

 

SPLEEN: Small accessory spleen is noted superiorly and posteromedially.

 

ADRENALS: No significant abnormality is seen.

 

KIDNEYS: 

Right kidney there are 4 calcifications within the right kidney. All measure less than 5 mm. No hydro
nephrosis.

 

Left kidney: There are approximately 10 calcifications within the left kidney all measuring less than
 5 mm. There is moderate left hydronephrosis with perinephric edema secondary to a proximal ureteral 
stone measuring 4.9 mm in diameter. Periureteral edema seen proximal.

 

URINARY BLADDER:  Bladder is incompletely distended and limited

 

ADENOPATHY:  None visualized.

 

OSSEOUS STRUCTURES:  Hypertrophic changes of the spine including the facets noted. Disc bulging L4-L5
 results in canal stenosis. Herniation or protrusion not excluded.

 

BOWEL:  Diverticulosis of the colon noted. Extensive retained fecal debris limits assessment bowel. L
ack of contrast also contributes.

 

OTHER: Prostate gland appears enlarged with calcifications. There is a 3.2 cm soft tissue lipoma laci
cent to the left hip. No free fluid or free air. Aorta of normal caliber. There is a small hiatal her
amy. A tiny amount of free fluid in the pelvis.

 

IMPRESSION:

1. Bilateral nephrolithiasis with moderate left hydronephrosis secondary to a 4.9 mm proximal left ur
eteral calculus.

2. Disc protrusion L4-L5 with suspected canal stenosis.

3. Subsegmental changes at the right lung base more typical of atelectasis than infiltrate. Chronic u
nderlying interstitial lung disease and bronchiectasis suggested.

## 2018-08-14 ENCOUNTER — HOSPITAL ENCOUNTER (OUTPATIENT)
Dept: HOSPITAL 47 - RADXRMAIN | Age: 68
Discharge: HOME | End: 2018-08-14
Attending: UROLOGY
Payer: MEDICARE

## 2018-08-14 DIAGNOSIS — N28.89: ICD-10-CM

## 2018-08-14 DIAGNOSIS — N20.0: Primary | ICD-10-CM

## 2018-08-14 PROCEDURE — 74018 RADEX ABDOMEN 1 VIEW: CPT

## 2018-08-14 NOTE — XR
EXAMINATION TYPE: XR KUB

 

DATE OF EXAM: 8/14/2018

 

COMPARISON: 6/20/2018

 

HISTORY: Abdominal pain

 

TECHNIQUE: One view abdominal series

 

FINDINGS:  

The osseous structures are intact.  The bowel gas pattern is nonspecific.

There appear to be 3 calcifications overlying the right kidney the largest measuring 6 mm suggestive 
of renal calculi.

Suspect that there is a 2 mm mid pole left renal calculus.

 

Degenerative and hypertrophic change of the spine. Calcifications in the pelvis are stable possibly r
elated to the prostate gland. Calcification previously noted adjacent to the L3-L4 disc interspace on
 the left is not seen with certainty and today's exam.

 

IMPRESSION:  

1. Bilateral nephrolithiasis as measured above. Note is made that the calculus seen at the level the 
mid left ureter between the L3 and L4 disc interspace on the left is not seen on today's exam. Calcif
ications in the pelvis are stable relative the previous exam and most likely in the basis of prostate
 calcifications..

## 2018-11-12 ENCOUNTER — HOSPITAL ENCOUNTER (OUTPATIENT)
Dept: HOSPITAL 47 - LABPAT | Age: 68
Discharge: HOME | End: 2018-11-12
Attending: UROLOGY
Payer: MEDICARE

## 2018-11-12 DIAGNOSIS — Z01.812: Primary | ICD-10-CM

## 2018-11-12 DIAGNOSIS — N20.0: ICD-10-CM

## 2018-11-12 LAB
ANION GAP SERPL CALC-SCNC: 6 MMOL/L
BASOPHILS # BLD AUTO: 0 K/UL (ref 0–0.2)
BASOPHILS NFR BLD AUTO: 1 %
BUN SERPL-SCNC: 14 MG/DL (ref 9–20)
CHLORIDE SERPL-SCNC: 107 MMOL/L (ref 98–107)
CO2 SERPL-SCNC: 29 MMOL/L (ref 22–30)
EOSINOPHIL # BLD AUTO: 0.5 K/UL (ref 0–0.7)
EOSINOPHIL NFR BLD AUTO: 9 %
ERYTHROCYTE [DISTWIDTH] IN BLOOD BY AUTOMATED COUNT: 4.61 M/UL (ref 4.3–5.9)
ERYTHROCYTE [DISTWIDTH] IN BLOOD: 13.1 % (ref 11.5–15.5)
HCT VFR BLD AUTO: 43.4 % (ref 39–53)
HGB BLD-MCNC: 14.5 GM/DL (ref 13–17.5)
LYMPHOCYTES # SPEC AUTO: 1.5 K/UL (ref 1–4.8)
LYMPHOCYTES NFR SPEC AUTO: 27 %
MCH RBC QN AUTO: 31.4 PG (ref 25–35)
MCHC RBC AUTO-ENTMCNC: 33.3 G/DL (ref 31–37)
MCV RBC AUTO: 94.2 FL (ref 80–100)
MONOCYTES # BLD AUTO: 0.3 K/UL (ref 0–1)
MONOCYTES NFR BLD AUTO: 6 %
NEUTROPHILS # BLD AUTO: 3 K/UL (ref 1.3–7.7)
NEUTROPHILS NFR BLD AUTO: 54 %
PH UR: 6 [PH] (ref 5–8)
PLATELET # BLD AUTO: 250 K/UL (ref 150–450)
POTASSIUM SERPL-SCNC: 4.8 MMOL/L (ref 3.5–5.1)
SODIUM SERPL-SCNC: 142 MMOL/L (ref 137–145)
SP GR UR: 1 (ref 1–1.03)
UROBILINOGEN UR QL STRIP: <2 MG/DL (ref ?–2)
WBC # BLD AUTO: 5.5 K/UL (ref 3.8–10.6)

## 2018-11-12 PROCEDURE — 81003 URINALYSIS AUTO W/O SCOPE: CPT

## 2018-11-12 PROCEDURE — 80051 ELECTROLYTE PANEL: CPT

## 2018-11-12 PROCEDURE — 84520 ASSAY OF UREA NITROGEN: CPT

## 2018-11-12 PROCEDURE — 82565 ASSAY OF CREATININE: CPT

## 2018-11-12 PROCEDURE — 36415 COLL VENOUS BLD VENIPUNCTURE: CPT

## 2018-11-12 PROCEDURE — 85025 COMPLETE CBC W/AUTO DIFF WBC: CPT

## 2018-11-18 NOTE — P.GSHP
History of Present Illness


H&P Date: 11/18/18





67 yo male with painful right renal stone who comes for eswl rt  The risks and 

complications have been discussed.





- Respiratory


Comment: 





asthma,hay fever





- Genitourinary (Male)


Genitourinary: Reports as per HPI





Past Medical History


Past Medical History: Asthma, GERD/Reflux, Hyperlipidemia, Sleep Apnea/CPAP/

BIPAP


Additional Past Medical History / Comment(s): doesn't use CPAP anymore since 

surgery, kidney stone.


History of Any Multi-Drug Resistant Organisms: None Reported


Past Surgical History: Orthopedic Surgery, Tonsillectomy


Additional Past Surgical History / Comment(s): uvulopalatopharyngoplasty, toe 

surg., nasal surg.


Past Anesthesia/Blood Transfusion Reactions: No Reported Reaction


Smoking Status: Never smoker





- Past Family History


  ** Brother(s)


Family Medical History: Cancer





Medications and Allergies


 Home Medications











 Medication  Instructions  Recorded  Confirmed  Type


 


Atorvastatin [Lipitor] 40 mg PO HS 06/13/17 11/13/18 History


 


Finasteride [Proscar] 5 mg PO HS 06/13/17 11/13/18 History


 


Fluticasone/Salmeterol [Advair Hfa 2 puff INHALATION BID 06/13/17 11/13/18 

History





230-21 Mcg Inhaler]    


 


Niacin 500 mg PO HS 06/13/17 11/13/18 History


 


Omeprazole 20 mg PO DAILY PRN 03/23/18 11/13/18 History


 


Tamsulosin HCl [Flomax] 0.4 mg PO DAILY #10 cap 06/20/18 11/13/18 Rx


 


Aspirin 81 mg PO HS 11/13/18 11/13/18 History


 


Theophylline Anhydrous 400 mg PO DAILY 11/13/18 11/13/18 History





[Theophylline]    


 


Ubidecarenone [Co Q-10] 100 mg PO DAILY 11/13/18 11/13/18 History


 


guaiFENesin [Mucinex] 1,200 mg PO DAILY 11/13/18 11/13/18 History


 


Acetaminophen Tab [Tylenol Tab] 650 mg PO Q6H PRN 11/14/18 11/14/18 History











 Allergies











Allergy/AdvReac Type Severity Reaction Status Date / Time


 


Milk Containing Products Allergy  Cough Verified 11/13/18 16:17





[Dairy]     


 


pollen extracts Allergy  Cough Verified 11/13/18 16:17


 


wheat Allergy  Cough Verified 11/13/18 16:17














Surgical - Exam





- General


well developed, well nourished, no distress





- Eyes


PERRL





- ENT


no hearing loss





- Neck


trachea midline





- Respiratory


normal expansion, normal respiratory effort





- Cardiovascular


Rhythm: regular





- Abdomen


Abdomen: soft, non tender





- Genitourinary


normal penis with no external lesions, testicles present





- Integumentary


no rash, no growths





- Neurologic


normal coordination, normal sensation





- Musculoskeletal


normal gait, normal posture





- Psychiatric


oriented to time, oriented to person, oriented to place, speech is normal, 

memory intact





Assessment and Plan


Assessment: 





Impression:  right renal stones


Plan:  eswl right

## 2018-11-19 ENCOUNTER — HOSPITAL ENCOUNTER (OUTPATIENT)
Dept: HOSPITAL 47 - ORWHC2ENDO | Age: 68
Discharge: HOME | End: 2018-11-19
Attending: UROLOGY
Payer: MEDICARE

## 2018-11-19 VITALS — SYSTOLIC BLOOD PRESSURE: 139 MMHG | HEART RATE: 79 BPM | DIASTOLIC BLOOD PRESSURE: 84 MMHG

## 2018-11-19 VITALS — BODY MASS INDEX: 26.5 KG/M2

## 2018-11-19 VITALS — RESPIRATION RATE: 18 BRPM

## 2018-11-19 VITALS — TEMPERATURE: 97.5 F

## 2018-11-19 DIAGNOSIS — E78.5: ICD-10-CM

## 2018-11-19 DIAGNOSIS — J45.909: ICD-10-CM

## 2018-11-19 DIAGNOSIS — Z91.018: ICD-10-CM

## 2018-11-19 DIAGNOSIS — Z79.51: ICD-10-CM

## 2018-11-19 DIAGNOSIS — Z91.011: ICD-10-CM

## 2018-11-19 DIAGNOSIS — Z79.899: ICD-10-CM

## 2018-11-19 DIAGNOSIS — Z87.442: ICD-10-CM

## 2018-11-19 DIAGNOSIS — N20.0: Primary | ICD-10-CM

## 2018-11-19 DIAGNOSIS — Z79.82: ICD-10-CM

## 2018-11-19 DIAGNOSIS — N40.0: ICD-10-CM

## 2018-11-19 DIAGNOSIS — K21.9: ICD-10-CM

## 2018-11-19 PROCEDURE — 74018 RADEX ABDOMEN 1 VIEW: CPT

## 2018-11-19 PROCEDURE — 50590 FRAGMENTING OF KIDNEY STONE: CPT

## 2018-11-19 RX ADMIN — POTASSIUM CHLORIDE SCH MLS: 14.9 INJECTION, SOLUTION INTRAVENOUS at 10:07

## 2018-11-19 RX ADMIN — POTASSIUM CHLORIDE SCH MLS: 14.9 INJECTION, SOLUTION INTRAVENOUS at 11:14

## 2018-11-19 NOTE — P.OP
Date of Procedure: 11/19/18


Preoperative Diagnosis: 


Right renal stones


Postoperative Diagnosis: 


Same


Procedure(s) Performed: 


Extracorporeal shockwave lithotripsy right 1300 shocks at energy level IV


Anesthesia: MAC


Surgeon: Devan Stanton


Pathology: none sent


Condition: stable


Disposition: PACU


Indications for Procedure: 


The patient is 68.  He previously had a ureteral stone removed.  He has what 

looks like 3 stones in his right kidney and comes for shockwave lithotripsy


Description of Procedure: 


Patient brought operating suite he is given a sedative anesthetic on the table.

  He has a tremendous amount of bowel gas overlying the right kidney.  To the 3 

stones are easily identified.  600 shocks were given to the upper pole stone 

700 shocks to the lower pole stone that both break nicely.  Then the procedure 

I looked throughout the kidney and see no remaining stones.  Patient awake and 

returned recovery in good condition.  He tolerated procedure well be discharged 

home upon recovery and found the office in one week with a KUB.

## 2018-11-19 NOTE — XR
Abdomen

 

HISTORY: Kidney stones

 

Frontal view of the abdomen submitted and correlated to prior abdomen dated 8/14/2018, CT abdomen pel
vis 6/20/2018

 

Large amount of retained fecal debris is present which could possibly obscure detail. Calcifications 
are present at the level of the prostate gland. There calcification superimposed over the lower pole 
and upper pole the right kidney measuring approximately 4 mm. Phlebolith present in the right hemipel
vis is again noted. Punctate left-sided renal calcifications are not as well seen. Previous identifie
d proximal left ureteral calculus is not seen. Vascular calcifications are present.

 

IMPRESSION: Right-sided nephrolithiasis and additional findings above.

## 2018-11-27 ENCOUNTER — HOSPITAL ENCOUNTER (OUTPATIENT)
Dept: HOSPITAL 47 - RADXRMAIN | Age: 68
Discharge: HOME | End: 2018-11-27
Attending: UROLOGY
Payer: MEDICARE

## 2018-11-27 DIAGNOSIS — N20.0: Primary | ICD-10-CM

## 2018-11-27 DIAGNOSIS — Z98.890: ICD-10-CM

## 2018-11-27 PROCEDURE — 74018 RADEX ABDOMEN 1 VIEW: CPT

## 2018-11-27 NOTE — XR
EXAMINATION TYPE: XR KUB

 

DATE OF EXAM: 11/27/2018

 

COMPARISON: NONE

 

HISTORY: Pain

 

TECHNIQUE: One view abdominal series

 

FINDINGS:  

The osseous structures are intact.  The bowel gas pattern is nonspecific. Large amount retained fecal
 debris throughout the colon. This does obscure renal outlines. Hypertrophic change of the spine..  

 

IMPRESSION:  

1.  Nonspecific abdomen. Extensive retained fecal debris correlate for constipation. This overlaps th
e renal outlines with no definitive calcification seen on today's exam.

## 2019-02-01 ENCOUNTER — HOSPITAL ENCOUNTER (OUTPATIENT)
Dept: HOSPITAL 47 - LABWHC1 | Age: 69
Discharge: HOME | End: 2019-02-01
Attending: ALLERGY & IMMUNOLOGY
Payer: MEDICARE

## 2019-02-01 DIAGNOSIS — J45.40: Primary | ICD-10-CM

## 2019-02-01 PROCEDURE — 36415 COLL VENOUS BLD VENIPUNCTURE: CPT

## 2019-02-01 PROCEDURE — 80198 ASSAY OF THEOPHYLLINE: CPT

## 2019-12-19 ENCOUNTER — HOSPITAL ENCOUNTER (OUTPATIENT)
Dept: HOSPITAL 47 - LABWHC1 | Age: 69
Discharge: HOME | End: 2019-12-19
Attending: ALLERGY & IMMUNOLOGY
Payer: MEDICARE

## 2019-12-19 DIAGNOSIS — J45.41: Primary | ICD-10-CM

## 2019-12-19 PROCEDURE — 80198 ASSAY OF THEOPHYLLINE: CPT

## 2019-12-19 PROCEDURE — 36415 COLL VENOUS BLD VENIPUNCTURE: CPT

## 2020-11-18 ENCOUNTER — HOSPITAL ENCOUNTER (OUTPATIENT)
Dept: HOSPITAL 47 - LABWHC1 | Age: 70
Discharge: HOME | End: 2020-11-18
Payer: MEDICARE

## 2020-11-18 DIAGNOSIS — J45.41: Primary | ICD-10-CM

## 2020-11-18 DIAGNOSIS — R00.0: ICD-10-CM

## 2020-11-18 PROCEDURE — 80198 ASSAY OF THEOPHYLLINE: CPT

## 2020-11-18 PROCEDURE — 36415 COLL VENOUS BLD VENIPUNCTURE: CPT

## 2022-01-05 ENCOUNTER — HOSPITAL ENCOUNTER (OUTPATIENT)
Dept: HOSPITAL 47 - ORWHC2ENDO | Age: 72
Discharge: HOME | End: 2022-01-05
Attending: INTERNAL MEDICINE
Payer: MEDICARE

## 2022-01-05 VITALS — RESPIRATION RATE: 16 BRPM | DIASTOLIC BLOOD PRESSURE: 78 MMHG | SYSTOLIC BLOOD PRESSURE: 133 MMHG | HEART RATE: 96 BPM

## 2022-01-05 VITALS — TEMPERATURE: 97.7 F

## 2022-01-05 VITALS — BODY MASS INDEX: 26.5 KG/M2

## 2022-01-05 DIAGNOSIS — Z91.09: ICD-10-CM

## 2022-01-05 DIAGNOSIS — K21.9: ICD-10-CM

## 2022-01-05 DIAGNOSIS — J45.909: ICD-10-CM

## 2022-01-05 DIAGNOSIS — Z79.51: ICD-10-CM

## 2022-01-05 DIAGNOSIS — D12.0: ICD-10-CM

## 2022-01-05 DIAGNOSIS — Z79.899: ICD-10-CM

## 2022-01-05 DIAGNOSIS — E78.5: ICD-10-CM

## 2022-01-05 DIAGNOSIS — Z79.82: ICD-10-CM

## 2022-01-05 DIAGNOSIS — Z86.010: ICD-10-CM

## 2022-01-05 DIAGNOSIS — Z12.11: Primary | ICD-10-CM

## 2022-01-05 PROCEDURE — 45385 COLONOSCOPY W/LESION REMOVAL: CPT

## 2022-01-05 PROCEDURE — 88305 TISSUE EXAM BY PATHOLOGIST: CPT

## 2022-01-05 NOTE — P.PCN
Date of Procedure: 01/05/22


Procedure(s) Performed: 


BRIEF HISTORY: Patient is a 71-year-old pleasant white male scheduled for an 

elective colonoscopy as a part of prior history of colon polyps.





PROCEDURE PERFORMED: Colonoscopy with snare polypectomy. 





PREOPERATIVE DIAGNOSIS: History of colon polyps. 





IV sedation per Anesthesia. 





PROCEDURE: After informed consent was obtained, the patient, was brought into 

the endoscopy unit. IV sedation was administered by Anesthesia under continuous 

monitoring.  Digital rectal examination was normal. Initially the Olympus CF-160

flexible video colonoscope was then inserted in the rectum, gradually advanced 

into the cecum without any difficulty. Careful examination was performed as the 

scope was gradually being withdrawn. Ileocecal valve and the appendiceal orifice

were visualized and appeared normal.  Prep was excellent.  The serum the cecum 

there was a 1 cm polyp that was removed by snare polypectomy.  Mucosa of the 

cecum, ascending colon, transverse colon, descending colon, sigmoid colon, and 

rectum appeared normal. Retroflexion was performed in the rectum and no lesions 

were seen. The patient tolerated the procedure well. 





IMPRESSION: 


1 cm cecal polyp status post polypectomy


Rest of the colon appeared normal





RECOMMENDATIONS:  Findings of this examination were discussed with the patient 

as well as his family.  He was advised to follow with the biopsy results.  If 

the biopsy results adenoma he can have a repeat colonoscopy in 3 years..

## 2023-01-04 ENCOUNTER — HOSPITAL ENCOUNTER (OUTPATIENT)
Dept: HOSPITAL 47 - LABWHC1 | Age: 73
Discharge: HOME | End: 2023-01-04
Payer: MEDICARE

## 2023-01-04 DIAGNOSIS — J45.40: Primary | ICD-10-CM

## 2023-01-04 PROCEDURE — 36415 COLL VENOUS BLD VENIPUNCTURE: CPT

## 2023-01-04 PROCEDURE — 80198 ASSAY OF THEOPHYLLINE: CPT

## 2024-11-19 ENCOUNTER — HOSPITAL ENCOUNTER (OUTPATIENT)
Dept: HOSPITAL 47 - CPPFTMAIN | Age: 74
End: 2024-11-19
Payer: MEDICARE

## 2024-11-19 DIAGNOSIS — Z91.011: ICD-10-CM

## 2024-11-19 DIAGNOSIS — Z91.018: ICD-10-CM

## 2024-11-19 DIAGNOSIS — Z91.048: ICD-10-CM

## 2024-11-19 DIAGNOSIS — J45.20: Primary | ICD-10-CM

## 2024-11-19 PROCEDURE — 94726 PLETHYSMOGRAPHY LUNG VOLUMES: CPT

## 2024-11-19 PROCEDURE — 94060 EVALUATION OF WHEEZING: CPT

## 2024-11-19 PROCEDURE — 94729 DIFFUSING CAPACITY: CPT
